# Patient Record
Sex: FEMALE | Race: BLACK OR AFRICAN AMERICAN | URBAN - METROPOLITAN AREA
[De-identification: names, ages, dates, MRNs, and addresses within clinical notes are randomized per-mention and may not be internally consistent; named-entity substitution may affect disease eponyms.]

---

## 2017-04-03 ENCOUNTER — IMPORTED ENCOUNTER (OUTPATIENT)
Dept: URBAN - METROPOLITAN AREA CLINIC 50 | Facility: CLINIC | Age: 72
End: 2017-04-03

## 2019-02-21 ENCOUNTER — IMPORTED ENCOUNTER (OUTPATIENT)
Dept: URBAN - METROPOLITAN AREA CLINIC 50 | Facility: CLINIC | Age: 74
End: 2019-02-21

## 2019-03-06 ENCOUNTER — IMPORTED ENCOUNTER (OUTPATIENT)
Dept: URBAN - METROPOLITAN AREA CLINIC 50 | Facility: CLINIC | Age: 74
End: 2019-03-06

## 2019-04-10 ENCOUNTER — IMPORTED ENCOUNTER (OUTPATIENT)
Dept: URBAN - METROPOLITAN AREA CLINIC 50 | Facility: CLINIC | Age: 74
End: 2019-04-10

## 2019-11-13 ENCOUNTER — IMPORTED ENCOUNTER (OUTPATIENT)
Dept: URBAN - METROPOLITAN AREA CLINIC 50 | Facility: CLINIC | Age: 74
End: 2019-11-13

## 2019-11-13 NOTE — PATIENT DISCUSSION
"""Restart Restasis both eyes twice a day ."" ""Start Refresh both eyes two - four times a day . """

## 2021-04-17 ASSESSMENT — TONOMETRY
OD_IOP_MMHG: 19
OS_IOP_MMHG: 16
OD_IOP_MMHG: 14
OD_IOP_MMHG: 15
OD_IOP_MMHG: 16
OS_IOP_MMHG: 16
OD_IOP_MMHG: 15
OS_IOP_MMHG: 16
OS_IOP_MMHG: 16
OS_IOP_MMHG: 18

## 2021-04-17 ASSESSMENT — VISUAL ACUITY
OS_CC: J4@ 14 IN
OD_CC: 20/50
OS_CC: 20/30
OD_CC: 20/50
OS_CC: 20/30+2
OD_CC: 20/30-2
OD_CC: J4@ 14 IN
OS_CC: 20/25